# Patient Record
Sex: FEMALE | Race: WHITE
[De-identification: names, ages, dates, MRNs, and addresses within clinical notes are randomized per-mention and may not be internally consistent; named-entity substitution may affect disease eponyms.]

---

## 2017-11-28 ENCOUNTER — HOSPITAL ENCOUNTER (OUTPATIENT)
Dept: HOSPITAL 92 - TBSIIMAG | Age: 62
Discharge: HOME | End: 2017-11-28
Attending: NEUROLOGICAL SURGERY
Payer: COMMERCIAL

## 2017-11-28 DIAGNOSIS — Z98.1: ICD-10-CM

## 2017-11-28 DIAGNOSIS — M54.5: Primary | ICD-10-CM

## 2017-11-28 DIAGNOSIS — M41.9: ICD-10-CM

## 2017-11-28 DIAGNOSIS — M47.816: ICD-10-CM

## 2017-11-28 PROCEDURE — 72100 X-RAY EXAM L-S SPINE 2/3 VWS: CPT

## 2017-11-28 NOTE — RAD
TWO VIEWS LUMBAR SPINE:

 

Date: 11-28-17 

 

History: Low back pain. Follow up from surgery. 

 

FINDINGS: 

There are five non rib bearing lumbar type vertebral bodies. There is posterior fusion at the L4-5 le
carol with unilateral left sided pedicular screws and posterior rods transfixing this level. Intradisca
l prosthesis is noted in place. There are scattered osteophytes in the lower lumbar spine. There is n
arrowing of the L4-5 and L5-S1 intervertebral disc spaces. The vertebral body heights are within norm
al limits. No fracture is seen. There is S-shaped curvature of the thoracolumbar spine. 

 

IMPRESSION: 

1. Post-surgical changes related to posterior fusion at the L4-5 level. 

2. Scattered degenerative changes, predominately within the lower lumbar spine. 

3. S-shaped scoliotic curvature of the thoracolumbar spine.

 

POS: [facility]

## 2018-02-26 ENCOUNTER — HOSPITAL ENCOUNTER (OUTPATIENT)
Dept: HOSPITAL 92 - LABBT | Age: 63
Discharge: HOME | End: 2018-02-26
Attending: OBSTETRICS & GYNECOLOGY
Payer: COMMERCIAL

## 2018-02-26 ENCOUNTER — HOSPITAL ENCOUNTER (INPATIENT)
Dept: HOSPITAL 92 - SURG A | Age: 63
LOS: 2 days | Discharge: HOME | DRG: 747 | End: 2018-02-28
Attending: OBSTETRICS & GYNECOLOGY | Admitting: OBSTETRICS & GYNECOLOGY
Payer: COMMERCIAL

## 2018-02-26 VITALS — BODY MASS INDEX: 23.3 KG/M2

## 2018-02-26 DIAGNOSIS — N81.6: Primary | ICD-10-CM

## 2018-02-26 DIAGNOSIS — J45.909: ICD-10-CM

## 2018-02-26 DIAGNOSIS — Z01.818: Primary | ICD-10-CM

## 2018-02-26 DIAGNOSIS — I10: ICD-10-CM

## 2018-02-26 DIAGNOSIS — Z90.11: ICD-10-CM

## 2018-02-26 DIAGNOSIS — K21.9: ICD-10-CM

## 2018-02-26 DIAGNOSIS — D09.8: ICD-10-CM

## 2018-02-26 LAB
HGB BLD-MCNC: 13.6 G/DL (ref 12–16)
MCH RBC QN AUTO: 32.8 PG (ref 27–31)
MCV RBC AUTO: 98.6 FL (ref 81–99)
PLATELET # BLD AUTO: 215 THOU/UL (ref 130–400)
RBC # BLD AUTO: 4.14 MILL/UL (ref 4.2–5.4)
WBC # BLD AUTO: 5.9 THOU/UL (ref 4.8–10.8)

## 2018-02-26 PROCEDURE — 85027 COMPLETE CBC AUTOMATED: CPT

## 2018-02-26 PROCEDURE — 93005 ELECTROCARDIOGRAM TRACING: CPT

## 2018-02-26 PROCEDURE — 86850 RBC ANTIBODY SCREEN: CPT

## 2018-02-26 PROCEDURE — 86900 BLOOD TYPING SEROLOGIC ABO: CPT

## 2018-02-26 PROCEDURE — 86901 BLOOD TYPING SEROLOGIC RH(D): CPT

## 2018-02-26 PROCEDURE — 93010 ELECTROCARDIOGRAM REPORT: CPT

## 2018-02-26 PROCEDURE — 71046 X-RAY EXAM CHEST 2 VIEWS: CPT

## 2018-02-26 PROCEDURE — 94640 AIRWAY INHALATION TREATMENT: CPT

## 2018-02-26 PROCEDURE — 36415 COLL VENOUS BLD VENIPUNCTURE: CPT

## 2018-02-26 NOTE — EKG
Test Reason : 

Blood Pressure : ***/*** mmHG

Vent. Rate : 071 BPM     Atrial Rate : 071 BPM

   P-R Int : 122 ms          QRS Dur : 078 ms

    QT Int : 404 ms       P-R-T Axes : 076 070 079 degrees

   QTc Int : 439 ms

 

Normal sinus rhythm

Normal ECG

When compared with ECG of 16-OCT-2017 15:05,

No significant change was found

Confirmed by DR. DHRUV VERDIN ([id number]) on 2/26/2018 5:15:40 PM

 

Referred By:  CATHERINE           Confirmed By:DR. DHRUV VERDIN

## 2018-02-26 NOTE — RAD
CHEST TWO VIEWS: 

 

History: Pre op evaluation. 

 

FINDINGS: 

Lung fields are clear. Heart and mediastinum appear normal. Osseous structures are unremarkable. 

 

IMPRESSION: 

No acute findings. 

 

POS: [facility]

## 2018-02-27 PROCEDURE — 0JQC0ZZ REPAIR PELVIC REGION SUBCUTANEOUS TISSUE AND FASCIA, OPEN APPROACH: ICD-10-PCS | Performed by: OBSTETRICS & GYNECOLOGY

## 2018-02-27 PROCEDURE — 0HQ9XZZ REPAIR PERINEUM SKIN, EXTERNAL APPROACH: ICD-10-PCS | Performed by: OBSTETRICS & GYNECOLOGY

## 2018-02-27 RX ADMIN — Medication SCH: at 21:33

## 2018-02-27 NOTE — OP
DATE:  02/27/2018

 

PREOPERATIVE DIAGNOSES:  Symptomatic moderate sized rectocele, previous hysterectomy.

 

POSTOPERATIVE DIAGNOSES:  Symptomatic moderate sized rectocele, previous hysterectomy, perineal relax
ation.

 

PROCEDURE:  Posterior colporrhaphy with perineorrhaphy.

 

SURGEON:  Neo Jacobs M.D.

 

ASSISTANT:  Jose Strong DO

 

ANESTHESIA:  LMA by CRNA (please see their notes for details).

 

OPERATIVE FINDINGS:  Third to fourth degree lower rectocele with no evidence of enterocele, no vault 
prolapse and very small cystocele which was not repaired.  There was perineal relaxation from mediola
teral episiotomy that had broken down long ago.

 

OPERATIVE COMPLICATIONS:  None.

 

BLOOD LOSS:  Less than 50 mL.

 

DRAINS:  Galvan catheter and a Vag Pack were the only drains.  Procedure as follows:

 

DESCRIPTION OF PROCEDURE:  The patient was taken to the operating room where she was given a general 
anesthetic with an LMA.  She was prepped and draped and placed in lithotomy position.  Her pathology 
was identified.  The rectocele was grasped with Allis clamps at the introitus and the midline portion
 of the posterior vaginal wall was injected with approximately 20 mL of 0.5% Xylocaine with epinephri
ne.  Subsequently, another 10 mL of that was used in the perineum for postoperative pain relief.

 

The vaginal mucosa was then incised in the midline with Lambert scissors and extended cephalad with the 
Metzenbaum scissors just under the mucosa dissecting the fascia off the mucosa and then sewing interr
upted 0 Vicryl and 0 Vicryl mattress sutures to reapproximate the fascia, which had been torn in the 
midline by previous delivery etc.  Then, there being no further bleeding from this area.  The dead sp
ace and the vaginal mucosa was closed with interrupted running locking and nonlocking 0 Vicryl suture
s down to the introitus.  That suture was cut and then tied to some interrupted 0 Vicryl sutures that
 were used to build up the perineum after a paris shaped area of vaginal mucosa and perineal skin w
as removed with a knife blade.

 

The remainder of the perineum was closed with running continuous mattress stitch that was subsequentl
y tied to the Vicryl stitch that tailed that had already been tied to the other interrupted Vicryl st
itches for the perineal build up.  A close inspection of the vagina revealed there was no further ble
eding and a wet Kerlix pack was placed in the vagina.  A Galvan catheter had already been inserted at 
the beginning of the case and bladder was empty of course and two rectal exams were performed during 
the procedure which show that there was no stitch through the rectum, both before closure of the muco
sa and then after closure of the mucosa.

 

The patient was taken to the recovery room having tolerated the procedure and anesthesia well.  

 

Copy to: 

[address] [place]  [address]

## 2018-02-28 VITALS — TEMPERATURE: 98.8 F | SYSTOLIC BLOOD PRESSURE: 113 MMHG | DIASTOLIC BLOOD PRESSURE: 57 MMHG

## 2018-02-28 LAB
HGB BLD-MCNC: 12.4 G/DL (ref 12–16)
MCH RBC QN AUTO: 33.7 PG (ref 27–31)
MCV RBC AUTO: 100 FL (ref 81–99)
PLATELET # BLD AUTO: 177 THOU/UL (ref 130–400)
RBC # BLD AUTO: 3.7 MILL/UL (ref 4.2–5.4)
WBC # BLD AUTO: 10.7 THOU/UL (ref 4.8–10.8)

## 2018-02-28 RX ADMIN — Medication SCH: at 09:59

## 2018-02-28 NOTE — DIS
REASON FOR ADMISSION:  Repair of rectocele and perineorrhaphy.

 

HOSPITAL COURSE:  (See operative admission history and physical, operative note, and laboratory for f
betother details).

 

Basically, this is a 62-year-old  multipara who has had a previous hysterectomy and removal of
 her ovaries in the distant past.  She has had right breast cancer which resulted in a mastectomy, bu
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  She is on tamoxifen.  She has mild asthma and reflux and mild hyp
ertension and those were all well controlled.  Her preoperative lab work was totally unremarkable inc
luding an EKG and chest x-ray.

 

On 02/27/2018, the patient underwent an LMA general anesthetic and had an unremarkable  posterior col
porrhaphy with minimal blood loss, but there was a small perineorrhaphy done as well.  The vag pack w
as placed in and a Galvan catheter and hence she was admitted overnight until the pack was removed the
 following morning on 02/28/2018.

 

Her vital signs remained normal.  There was minimal blood on her pack per the nurses' notes.  Her per
ineum was dry and she had minimal spotting afterwards and had voided well and was tolerating general 
diet and said her pain was 0.  The only thing she received for pain was IV Toradol every 6 hours with
 her last dose at 0800 on 02/28/2018.  Her postoperative hematocrit was 37%.  Her vital signs remaine
d normal.

 

IMPRESSION AND FINAL DIAGNOSES:  Prior hysterectomy with a moderately large rectocele, now status pos
t posterior colporrhaphy and perineorrhaphy.

 

PLAN:  Discharge the patient home with strict instructions and warnings.  She knows not to lift anyth
ing heavy, not to strain to have bowel movements, stay on a stool softener and mild laxatives as need
ed.  Resume all her other medicines.  She can use ibuprofen or Aleve every 8 hours as needed, and may
 add Tylenol to that if she needs to.  I told her how she could do sitz baths if she wanted and she k
nows to follow up in 2 weeks and has an appointment.  She will follow up again at 5 weeks, a week bef
ore she leaves to go to the [facility].  Warnings and instructions were given both written and verbal.
  She has phone numbers to call if she needs any advice or to be triaged to the hospital.  Questions 
were entertained.